# Patient Record
Sex: FEMALE | URBAN - METROPOLITAN AREA
[De-identification: names, ages, dates, MRNs, and addresses within clinical notes are randomized per-mention and may not be internally consistent; named-entity substitution may affect disease eponyms.]

---

## 2021-07-20 ENCOUNTER — TELEPHONE (OUTPATIENT)
Dept: OBGYN CLINIC | Facility: CLINIC | Age: 51
End: 2021-07-20

## 2021-07-20 NOTE — TELEPHONE ENCOUNTER
Hernesto holt/shantell Cassia Echols is having pain up inside  The cream was only for outside  Returned call to [facility] who reports Cassia Echols is now having pain and burning with urination  C/O pain up inside  Completed flagyl and has been using nystatin cream externally  Recommended contact PCP for evaluation of urinary symptoms  Hernesto verbalized understanding